# Patient Record
Sex: FEMALE | Race: WHITE | Employment: OTHER | URBAN - METROPOLITAN AREA
[De-identification: names, ages, dates, MRNs, and addresses within clinical notes are randomized per-mention and may not be internally consistent; named-entity substitution may affect disease eponyms.]

---

## 2017-06-28 PROBLEM — Z96.619 S/P SHOULDER REPLACEMENT: Status: ACTIVE | Noted: 2017-06-28

## 2024-08-22 ENCOUNTER — HOSPITAL ENCOUNTER (EMERGENCY)
Age: 89
Discharge: HOME | End: 2024-08-22
Attending: EMERGENCY MEDICINE

## 2024-08-22 ENCOUNTER — APPOINTMENT (OUTPATIENT)
Dept: CT IMAGING | Age: 89
End: 2024-08-22

## 2024-08-22 ENCOUNTER — APPOINTMENT (OUTPATIENT)
Dept: GENERAL RADIOLOGY | Age: 89
End: 2024-08-22

## 2024-08-22 VITALS
RESPIRATION RATE: 18 BRPM | OXYGEN SATURATION: 96 % | WEIGHT: 223 LBS | SYSTOLIC BLOOD PRESSURE: 181 MMHG | HEIGHT: 67 IN | HEART RATE: 100 BPM | DIASTOLIC BLOOD PRESSURE: 72 MMHG | TEMPERATURE: 98.2 F | BODY MASS INDEX: 35 KG/M2
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
## 2024-08-22 DIAGNOSIS — S09.90XA INJURY OF HEAD, INITIAL ENCOUNTER: ICD-10-CM

## 2024-08-22 PROCEDURE — 90714 TD VACC NO PRESV 7 YRS+ IM: CPT

## 2024-08-22 PROCEDURE — 72125 CT NECK SPINE W/O DYE: CPT

## 2024-08-22 PROCEDURE — 99284 EMERGENCY DEPT VISIT MOD MDM: CPT

## 2024-08-22 PROCEDURE — 6370000000 HC RX 637 (ALT 250 FOR IP)

## 2024-08-22 PROCEDURE — 71045 X-RAY EXAM CHEST 1 VIEW: CPT

## 2024-08-22 PROCEDURE — 70450 CT HEAD/BRAIN W/O DYE: CPT

## 2024-08-22 PROCEDURE — 73521 X-RAY EXAM HIPS BI 2 VIEWS: CPT

## 2024-08-22 PROCEDURE — 90471 IMMUNIZATION ADMIN: CPT

## 2024-08-22 PROCEDURE — 6360000002 HC RX W HCPCS

## 2024-08-22 RX ORDER — BACITRACIN ZINC 500 [USP'U]/G
OINTMENT TOPICAL ONCE
Status: COMPLETED | OUTPATIENT
Start: 2024-08-22 | End: 2024-08-22

## 2024-08-22 RX ADMIN — CLOSTRIDIUM TETANI TOXOID ANTIGEN (FORMALDEHYDE INACTIVATED) AND CORYNEBACTERIUM DIPHTHERIAE TOXOID ANTIGEN (FORMALDEHYDE INACTIVATED) 0.5 ML: 5; 2 INJECTION, SUSPENSION INTRAMUSCULAR at 19:34

## 2024-08-22 RX ADMIN — BACITRACIN ZINC: 500 OINTMENT TOPICAL at 21:38

## 2024-08-22 ASSESSMENT — LIFESTYLE VARIABLES: HOW OFTEN DO YOU HAVE A DRINK CONTAINING ALCOHOL: NEVER

## 2024-08-22 NOTE — ED PROVIDER NOTES
Asymptomatic at this time.  Given her symptoms, concern for differentials as listed below.  Therefore, workup was obtained.  CT head with no intracranial hemorrhage, does reveal left mastoid effusion concerning for mastoiditis however patient not having any ear pain.  On physical exam bilateral tympanic membranes unremarkable with no mastoid tenderness to palpation.  CT cervical spine with no acute fractures or subluxation.  X-ray of bilateral hips unremarkable and chest x-ray with no rib fracture.  Patient's tetanus was updated.  Scalp was cleaned, no laceration did have small abrasion this was dressed with bacitracin and bandage in ED.  Plan to discharge to home and advised close follow-up with PCP and strict return precautions.  She is understanding and agreeable to plan.    Amount and/or Complexity of Data Reviewed  Radiology: ordered and independent interpretation performed.    Risk  OTC drugs.  Prescription drug management.        History From: Patient    Social Determinants of Health : None    Chronic Conditions affecting care: Wendy is an 89-year-old female who   has a past medical history of Arthritis, Diabetes mellitus (HCC), H/O cardiovascular stress test (06/13/2017), Hyperlipidemia, and Hypertension.     Records Reviewed (Source) orthopedics note reviewed - Dr. Yancey 6/28/17    CONSULTS: (Who and What was discussed)  None     See ED COURSE    Disposition Considerations (Tests not ordered but considered, Shared Decision Making, Pt Expectation of Test or Tx.): Spoke with pt regarding plans for testing and tx; she is understanding and agreeable to plan.     Diagnoses considered include (but not limited to): ICH vs skull fx vs c-spine fx, etc.     See ED COURSE for additional MDM. Labs & imaging reviewed and interpreted, see RESULTS above.     Re-Evaluations:           See ED Course above.       This patient's ED course included: a personal history and physicial examination and multiple bedside